# Patient Record
Sex: MALE | Race: AMERICAN INDIAN OR ALASKA NATIVE
[De-identification: names, ages, dates, MRNs, and addresses within clinical notes are randomized per-mention and may not be internally consistent; named-entity substitution may affect disease eponyms.]

---

## 2021-12-14 ENCOUNTER — HOSPITAL ENCOUNTER (EMERGENCY)
Dept: HOSPITAL 5 - ED | Age: 21
Discharge: HOME | End: 2021-12-14
Payer: SELF-PAY

## 2021-12-14 VITALS — SYSTOLIC BLOOD PRESSURE: 109 MMHG | DIASTOLIC BLOOD PRESSURE: 49 MMHG
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
## 2021-12-14 DIAGNOSIS — S22.42XA: Primary | ICD-10-CM
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
## 2021-12-14 DIAGNOSIS — S00.91XA: ICD-10-CM

## 2021-12-14 DIAGNOSIS — J93.9: ICD-10-CM

## 2021-12-14 LAB
ALBUMIN SERPL-MCNC: 4.2 G/DL (ref 3.9–5)
ALT SERPL-CCNC: 19 UNITS/L (ref 7–56)
BASOPHILS # (AUTO): 0 K/MM3 (ref 0–0.1)
BASOPHILS NFR BLD AUTO: 0.6 % (ref 0–1.8)
BUN SERPL-MCNC: 9 MG/DL (ref 9–20)
BUN/CREAT SERPL: 10 %
CALCIUM SERPL-MCNC: 9.3 MG/DL (ref 8.4–10.2)
EOSINOPHIL # BLD AUTO: 0.1 K/MM3 (ref 0–0.4)
EOSINOPHIL NFR BLD AUTO: 1.2 % (ref 0–4.3)
HCT VFR BLD CALC: 43.8 % (ref 35.5–45.6)
HEMOLYSIS INDEX: 21
HGB BLD-MCNC: 14.5 GM/DL (ref 11.8–15.2)
LYMPHOCYTES # BLD AUTO: 1.1 K/MM3 (ref 1.2–5.4)
LYMPHOCYTES NFR BLD AUTO: 14.8 % (ref 13.4–35)
MCHC RBC AUTO-ENTMCNC: 33 % (ref 32–34)
MCV RBC AUTO: 93 FL (ref 84–94)
MONOCYTES # (AUTO): 0.7 K/MM3 (ref 0–0.8)
MONOCYTES % (AUTO): 9.7 % (ref 0–7.3)
PLATELET # BLD: 230 K/MM3 (ref 140–440)
RBC # BLD AUTO: 4.74 M/MM3 (ref 3.65–5.03)

## 2021-12-14 PROCEDURE — 36415 COLL VENOUS BLD VENIPUNCTURE: CPT

## 2021-12-14 PROCEDURE — 85025 COMPLETE CBC W/AUTO DIFF WBC: CPT

## 2021-12-14 PROCEDURE — 99284 EMERGENCY DEPT VISIT MOD MDM: CPT

## 2021-12-14 PROCEDURE — 84132 ASSAY OF SERUM POTASSIUM: CPT

## 2021-12-14 PROCEDURE — 74177 CT ABD & PELVIS W/CONTRAST: CPT

## 2021-12-14 PROCEDURE — 73030 X-RAY EXAM OF SHOULDER: CPT

## 2021-12-14 PROCEDURE — 71260 CT THORAX DX C+: CPT

## 2021-12-14 PROCEDURE — 82550 ASSAY OF CK (CPK): CPT

## 2021-12-14 PROCEDURE — 70450 CT HEAD/BRAIN W/O DYE: CPT

## 2021-12-14 PROCEDURE — 80053 COMPREHEN METABOLIC PANEL: CPT

## 2021-12-14 PROCEDURE — 71046 X-RAY EXAM CHEST 2 VIEWS: CPT
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
